# Patient Record
Sex: FEMALE | Race: WHITE
[De-identification: names, ages, dates, MRNs, and addresses within clinical notes are randomized per-mention and may not be internally consistent; named-entity substitution may affect disease eponyms.]

---

## 2020-09-06 ENCOUNTER — HOSPITAL ENCOUNTER (EMERGENCY)
Dept: HOSPITAL 47 - EC | Age: 50
Discharge: HOME | End: 2020-09-06
Payer: COMMERCIAL

## 2020-09-06 VITALS
SYSTOLIC BLOOD PRESSURE: 162 MMHG | DIASTOLIC BLOOD PRESSURE: 98 MMHG | RESPIRATION RATE: 16 BRPM | TEMPERATURE: 98.2 F | HEART RATE: 65 BPM
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
## 2020-09-06 DIAGNOSIS — Z88.0: ICD-10-CM

## 2020-09-06 DIAGNOSIS — F17.200: ICD-10-CM

## 2020-09-06 DIAGNOSIS — T14.8XXA: ICD-10-CM

## 2020-09-06 DIAGNOSIS — S91.011A: ICD-10-CM

## 2020-09-06 DIAGNOSIS — S70.11XA: Primary | ICD-10-CM

## 2020-09-06 DIAGNOSIS — J98.11: ICD-10-CM

## 2020-09-06 DIAGNOSIS — Z23: ICD-10-CM

## 2020-09-06 LAB
ALBUMIN SERPL-MCNC: 4.6 G/DL (ref 3.5–5)
ALP SERPL-CCNC: 62 U/L (ref 38–126)
ALT SERPL-CCNC: 16 U/L (ref 4–34)
ANION GAP SERPL CALC-SCNC: 8 MMOL/L
AST SERPL-CCNC: 35 U/L (ref 14–36)
BASOPHILS # BLD AUTO: 0.1 K/UL (ref 0–0.2)
BASOPHILS NFR BLD AUTO: 1 %
BUN SERPL-SCNC: 17 MG/DL (ref 7–17)
CALCIUM SPEC-MCNC: 9.6 MG/DL (ref 8.4–10.2)
CHLORIDE SERPL-SCNC: 104 MMOL/L (ref 98–107)
CO2 SERPL-SCNC: 25 MMOL/L (ref 22–30)
EOSINOPHIL # BLD AUTO: 0.5 K/UL (ref 0–0.7)
EOSINOPHIL NFR BLD AUTO: 5 %
ERYTHROCYTE [DISTWIDTH] IN BLOOD BY AUTOMATED COUNT: 4.87 M/UL (ref 3.8–5.4)
ERYTHROCYTE [DISTWIDTH] IN BLOOD: 12.5 % (ref 11.5–15.5)
GLUCOSE SERPL-MCNC: 89 MG/DL (ref 74–99)
HCT VFR BLD AUTO: 44.7 % (ref 34–46)
HGB BLD-MCNC: 14.7 GM/DL (ref 11.4–16)
KETONES UR QL STRIP.AUTO: (no result)
LYMPHOCYTES # SPEC AUTO: 2.5 K/UL (ref 1–4.8)
LYMPHOCYTES NFR SPEC AUTO: 28 %
MCH RBC QN AUTO: 30.2 PG (ref 25–35)
MCHC RBC AUTO-ENTMCNC: 32.9 G/DL (ref 31–37)
MCV RBC AUTO: 91.8 FL (ref 80–100)
MONOCYTES # BLD AUTO: 0.4 K/UL (ref 0–1)
MONOCYTES NFR BLD AUTO: 5 %
NEUTROPHILS # BLD AUTO: 5.3 K/UL (ref 1.3–7.7)
NEUTROPHILS NFR BLD AUTO: 60 %
PH UR: 6 [PH] (ref 5–8)
PLATELET # BLD AUTO: 254 K/UL (ref 150–450)
POTASSIUM SERPL-SCNC: 4.3 MMOL/L (ref 3.5–5.1)
PROT SERPL-MCNC: 7.3 G/DL (ref 6.3–8.2)
SODIUM SERPL-SCNC: 137 MMOL/L (ref 137–145)
SP GR UR: 1.05 (ref 1–1.03)
UROBILINOGEN UR QL STRIP: <2 MG/DL (ref ?–2)
WBC # BLD AUTO: 8.8 K/UL (ref 3.8–10.6)

## 2020-09-06 PROCEDURE — 72132 CT LUMBAR SPINE W/DYE: CPT

## 2020-09-06 PROCEDURE — 36415 COLL VENOUS BLD VENIPUNCTURE: CPT

## 2020-09-06 PROCEDURE — 73610 X-RAY EXAM OF ANKLE: CPT

## 2020-09-06 PROCEDURE — 85025 COMPLETE CBC W/AUTO DIFF WBC: CPT

## 2020-09-06 PROCEDURE — 90471 IMMUNIZATION ADMIN: CPT

## 2020-09-06 PROCEDURE — 81003 URINALYSIS AUTO W/O SCOPE: CPT

## 2020-09-06 PROCEDURE — 71046 X-RAY EXAM CHEST 2 VIEWS: CPT

## 2020-09-06 PROCEDURE — 73552 X-RAY EXAM OF FEMUR 2/>: CPT

## 2020-09-06 PROCEDURE — 99284 EMERGENCY DEPT VISIT MOD MDM: CPT

## 2020-09-06 PROCEDURE — 90715 TDAP VACCINE 7 YRS/> IM: CPT

## 2020-09-06 PROCEDURE — 72129 CT CHEST SPINE W/DYE: CPT

## 2020-09-06 PROCEDURE — 80053 COMPREHEN METABOLIC PANEL: CPT

## 2020-09-06 PROCEDURE — 12002 RPR S/N/AX/GEN/TRNK2.6-7.5CM: CPT

## 2020-09-06 PROCEDURE — 74177 CT ABD & PELVIS W/CONTRAST: CPT

## 2020-09-06 PROCEDURE — 83690 ASSAY OF LIPASE: CPT

## 2020-09-06 PROCEDURE — 81025 URINE PREGNANCY TEST: CPT

## 2020-09-06 NOTE — CT
EXAMINATION TYPE: CT abdomen pelvis w con

 

DATE OF EXAM: 9/6/2020

 

COMPARISON: None

 

HISTORY: back pain following crushing injury

 

CT DLP: combined  mGycm

Automated exposure control for dose reduction was used.

 

CONTRAST: 

Performed with IV Contrast, patient injected with 100 mL of Isovue 300.

 

There is minimal subsegmental atelectasis at the lung bases. Heart size is normal. There is no perica
rdial effusion. There is no pleural effusion or pneumothorax.

 

Liver spleen stomach appear normal. Bile ducts are not dilated. There is no pancreatic mass.

 

There is no adrenal mass. Kidneys show satisfactory contrast opacification. There is no hydronephrosi
s. Ureters are not dilated. Delayed images show normal renal excretion. There is no retroperitoneal a
denopathy. Bladder distends smoothly. There is no inguinal hernia. There is no free fluid in the pelv
is. Uterus is retroverted. There is no evidence of pelvic mass.

 

Lumbar vertebra have normal spacing and alignment. Posterior elements are intact. Bony pelvis is inta
ct. Hip joints appear normal.

 

There is no mesenteric edema. There is no ascites or free air. There is no bowel obstruction. Appendi
x is posterior and appears normal.

 

IMPRESSION:

No evidence of traumatic injury of the abdomen and pelvis. Mild subsegmental atelectasis at the lung 
bases.

## 2020-09-06 NOTE — XR
EXAMINATION TYPE: XR chest 2V

 

DATE OF EXAM: 9/6/2020

 

COMPARISON: NONE

 

HISTORY: Pain

 

TECHNIQUE: 2 views

 

FINDINGS: Heart and mediastinum are normal. Lungs are clear. Diaphragm is normal. Bony thorax appears
 normal.

 

IMPRESSION: Normal chest. Normal heart.

## 2020-09-06 NOTE — XR
EXAMINATION TYPE: XR femur RT

 

DATE OF EXAM: 9/6/2020

 

COMPARISON: NONE

 

HISTORY: Pain

 

TECHNIQUE: 4 views

 

FINDINGS: I see no fracture nor dislocation. Joint spaces are normal. There are no pathologic calcifi
cations.

 

IMPRESSION: Negative right femur exam.

## 2020-09-06 NOTE — ED
Trauma HPI





- General


Chief Complaint: Trauma


Stated Complaint: riding  tipped over on her


Time Seen by Provider: 09/06/20 16:45


Source: patient


Mode of arrival: ambulatory


Limitations: no limitations





- History of Present Illness


Initial Comments: 





Patient is a 50-year-old female who presents to the emergency department with 

reported thoracic and lumbar back pain, lower pelvic pain, right ankle and femur

pain secondary to a crush injury.  She reports that she was attempting to help 

her  remove a riding lawnmower from a shed.   was pulling a 

lawnmower with his truck.  The lawnmower ended up tipping over, falling on top 

of the patient and she became pinned.  Patient sustained a laceration to her 

right ankle.  He is complaining of right ankle pain, right femur pain where she 

has a hematoma and thoracic and lumbar back pain.  She does not take any blood 

thinners.  Denies hitting her head.  No loss of consciousness.  Denies any 

headaches or visual changes.  No unilateral numbness or weakness.  Patient was 

infiltrated into the emergency department without difficulty.  No concern for 

pregnancy.  Did not take anything for pain.  No other alleviating, precipitating

or modifying factors





- Related Data


                                  Previous Rx's











 Medication  Instructions  Recorded


 


Hydrocodone/Acetaminophen [Norco 1 tab PO Q6HR PRN #12 tab 09/06/20





5-325]  











                                    Allergies











Allergy/AdvReac Type Severity Reaction Status Date / Time


 


Penicillins Allergy  Rash/Hives Verified 09/06/20 16:53














Review of Systems


ROS Statement: 


Those systems with pertinent positive or pertinent negative responses have been 

documented in the HPI.





ROS Other: All systems not noted in ROS Statement are negative.





Past Medical History


Past Medical History: No Reported History


History of Any Multi-Drug Resistant Organisms: None Reported


Past Surgical History: No Surgical Hx Reported


Past Psychological History: No Psychological Hx Reported


Smoking Status: Current every day smoker


Past Alcohol Use History: Occasional


Past Drug Use History: None Reported





General Exam


Limitations: no limitations


General appearance: alert, in no apparent distress


Head exam: Present: atraumatic, normocephalic, normal inspection


Eye exam: Present: normal appearance, PERRL, EOMI.  Absent: scleral icterus, 

conjunctival injection, periorbital swelling


ENT exam: Present: normal exam, mucous membranes moist


Neck exam: Present: normal inspection.  Absent: tenderness, meningismus, 

lymphadenopathy


Respiratory exam: Present: normal lung sounds bilaterally.  Absent: respiratory 

distress, wheezes, rales, rhonchi, stridor


Cardiovascular Exam: Present: regular rate, normal rhythm, normal heart sounds. 

Absent: systolic murmur, diastolic murmur, rubs, gallop, clicks


GI/Abdominal exam: Present: soft, normal bowel sounds.  Absent: distended, 

tenderness, guarding, rebound, rigid


Extremities exam: Present: full ROM, tenderness (right lateral ankle. Mild ankle

swelling. No foot or skin pain. 2+ DP and PT pulses.), normal capillary refill. 

Absent: pedal edema, joint swelling, calf tenderness


Back exam: Present: paraspinal tenderness (entire thoracic and lumbar regions pe

r the patient. No step off or deformities appreciated. No overlying ecchmyosis)


Neurological exam: Present: alert, oriented X3, CN II-XII intact


Psychiatric exam: Present: normal affect, normal mood


Skin exam: Present: warm, dry, normal color, other (laceration right lateral 

ankle - 4 x 1 cm. ).  Absent: rash





Course


                                   Vital Signs











  09/06/20 09/06/20





  16:47 20:37


 


Temperature 98.5 F 98.2 F


 


Pulse Rate 66 65


 


Respiratory 18 16





Rate  


 


Blood Pressure 160/93 162/98


 


O2 Sat by Pulse 99 98





Oximetry  














Procedures





- Laceration


  ** Laceration #1


Consent Obtained: verbal consent


Indication: laceration


Site: lower extremity


Size (cm): 4


Description: linear


Depth: simple, single layer


Anesthetic Used: lidocaine 1%


Anesthesia Technique: local infiltration


Amount (mls): 10


Pre-repair: wound explored, irrigated extensively, deep structures intact


Type of Sutures: nylon


Size of Sutures: 5-0


Number of Sutures: 5


Technique: simple, interrupted


Patient Tolerated Procedure: well, no complications





Medical Decision Making





- Medical Decision Making





Pulmonary the patient was placed into room 28.  A thorough history and physical 

exam was performed.  Peripheral IV is established.  Patient was given a Norco 

7.5.  Tetanus is updated as patient is not up-to-date on her tetanus.  

Laboratory studies were conducted due to the significance of the trauma.  

Laboratory studies are unremarkable.  Patient was sent for a CT of her abdomen, 

pelvis, thoracic and lumbar spine.  No acute signs of trauma to the abdomen.  

Mild subsegmental atelectasis at the lung bases.  Femur and ankle x-ray are 

negative for any acute fractures.  I did repair the patient's laceration with 5,

simple interrupted sutures.  Patient will be discharged home at this time and is

to follow-up with primary care physician within 2-4 days.  She will be provided 

with a stirrup splint to wear over her right ankle and to ambulate with crutches

that she has at home.  She may need repeat imaging if she continues to have 

pain.  Return to the emergency room for any new or worsening symptoms.  She was 

given a prescription for Norco.  Side effect profile is discussed the patient.  

She must return to the emergency department in 7-10 days after sutures removed. 

Patient understood this.  Patient was discharged home in stable condition





- Lab Data


Result diagrams: 


                                 09/06/20 18:06





                                 09/06/20 18:06


                                   Lab Results











  09/06/20 09/06/20 09/06/20 Range/Units





  18:06 18:06 18:06 


 


WBC  8.8    (3.8-10.6)  k/uL


 


RBC  4.87    (3.80-5.40)  m/uL


 


Hgb  14.7    (11.4-16.0)  gm/dL


 


Hct  44.7    (34.0-46.0)  %


 


MCV  91.8    (80.0-100.0)  fL


 


MCH  30.2    (25.0-35.0)  pg


 


MCHC  32.9    (31.0-37.0)  g/dL


 


RDW  12.5    (11.5-15.5)  %


 


Plt Count  254    (150-450)  k/uL


 


Neutrophils %  60    %


 


Lymphocytes %  28    %


 


Monocytes %  5    %


 


Eosinophils %  5    %


 


Basophils %  1    %


 


Neutrophils #  5.3    (1.3-7.7)  k/uL


 


Lymphocytes #  2.5    (1.0-4.8)  k/uL


 


Monocytes #  0.4    (0-1.0)  k/uL


 


Eosinophils #  0.5    (0-0.7)  k/uL


 


Basophils #  0.1    (0-0.2)  k/uL


 


Sodium     (137-145)  mmol/L


 


Potassium     (3.5-5.1)  mmol/L


 


Chloride     ()  mmol/L


 


Carbon Dioxide     (22-30)  mmol/L


 


Anion Gap     mmol/L


 


BUN     (7-17)  mg/dL


 


Creatinine     (0.52-1.04)  mg/dL


 


Est GFR (CKD-EPI)AfAm     (>60 ml/min/1.73 sqM)  


 


Est GFR (CKD-EPI)NonAf     (>60 ml/min/1.73 sqM)  


 


Glucose     (74-99)  mg/dL


 


Calcium     (8.4-10.2)  mg/dL


 


Total Bilirubin     (0.2-1.3)  mg/dL


 


AST     (14-36)  U/L


 


ALT     (4-34)  U/L


 


Alkaline Phosphatase     ()  U/L


 


Total Protein     (6.3-8.2)  g/dL


 


Albumin     (3.5-5.0)  g/dL


 


Lipase     ()  U/L


 


Urine Color   Light Yellow   


 


Urine Appearance   Clear   (Clear)  


 


Urine pH   6.0   (5.0-8.0)  


 


Ur Specific Gravity   1.050 H   (1.001-1.035)  


 


Urine Protein   Negative   (Negative)  


 


Urine Glucose (UA)   Negative   (Negative)  


 


Urine Ketones   1+ H   (Negative)  


 


Urine Blood   Negative   (Negative)  


 


Urine Nitrite   Negative   (Negative)  


 


Urine Bilirubin   Negative   (Negative)  


 


Urine Urobilinogen   <2.0   (<2.0)  mg/dL


 


Ur Leukocyte Esterase   Negative   (Negative)  


 


Urine HCG, Qual    Not Detected  (Not Detectd)  














  09/06/20 Range/Units





  18:06 


 


WBC   (3.8-10.6)  k/uL


 


RBC   (3.80-5.40)  m/uL


 


Hgb   (11.4-16.0)  gm/dL


 


Hct   (34.0-46.0)  %


 


MCV   (80.0-100.0)  fL


 


MCH   (25.0-35.0)  pg


 


MCHC   (31.0-37.0)  g/dL


 


RDW   (11.5-15.5)  %


 


Plt Count   (150-450)  k/uL


 


Neutrophils %   %


 


Lymphocytes %   %


 


Monocytes %   %


 


Eosinophils %   %


 


Basophils %   %


 


Neutrophils #   (1.3-7.7)  k/uL


 


Lymphocytes #   (1.0-4.8)  k/uL


 


Monocytes #   (0-1.0)  k/uL


 


Eosinophils #   (0-0.7)  k/uL


 


Basophils #   (0-0.2)  k/uL


 


Sodium  137  (137-145)  mmol/L


 


Potassium  4.3  (3.5-5.1)  mmol/L


 


Chloride  104  ()  mmol/L


 


Carbon Dioxide  25  (22-30)  mmol/L


 


Anion Gap  8  mmol/L


 


BUN  17  (7-17)  mg/dL


 


Creatinine  0.55  (0.52-1.04)  mg/dL


 


Est GFR (CKD-EPI)AfAm  >90  (>60 ml/min/1.73 sqM)  


 


Est GFR (CKD-EPI)NonAf  >90  (>60 ml/min/1.73 sqM)  


 


Glucose  89  (74-99)  mg/dL


 


Calcium  9.6  (8.4-10.2)  mg/dL


 


Total Bilirubin  0.6  (0.2-1.3)  mg/dL


 


AST  35  (14-36)  U/L


 


ALT  16  (4-34)  U/L


 


Alkaline Phosphatase  62  ()  U/L


 


Total Protein  7.3  (6.3-8.2)  g/dL


 


Albumin  4.6  (3.5-5.0)  g/dL


 


Lipase  35  ()  U/L


 


Urine Color   


 


Urine Appearance   (Clear)  


 


Urine pH   (5.0-8.0)  


 


Ur Specific Gravity   (1.001-1.035)  


 


Urine Protein   (Negative)  


 


Urine Glucose (UA)   (Negative)  


 


Urine Ketones   (Negative)  


 


Urine Blood   (Negative)  


 


Urine Nitrite   (Negative)  


 


Urine Bilirubin   (Negative)  


 


Urine Urobilinogen   (<2.0)  mg/dL


 


Ur Leukocyte Esterase   (Negative)  


 


Urine HCG, Qual   (Not Detectd)  














Disposition


Clinical Impression: 


 Laceration of right ankle, Crush injury, Acute thoracic back pain, Lumbar back 

pain





Disposition: HOME SELF-CARE


Condition: Stable


Instructions (If sedation given, give patient instructions):  Care For Your 

Stitches (DC), Laceration (ED)


Additional Instructions: 


Please follow-up with your primary care doctor.  Return to the emergency room 

for any new or worsening symptoms


Prescriptions: 


Hydrocodone/Acetaminophen [Norco 5-325] 1 tab PO Q6HR PRN #12 tab


 PRN Reason: Pain


Is patient prescribed a controlled substance at d/c from ED?: Yes


When asked, does pt state using other controlled substances?: No


If prescribed controlled substance>3 days was MAPS reviewed?: Prescribed <3 Days


If opioid is for acute pain is fill amount 7 days or less?: Yes


If Rx opioid, was Start Talking consent form obtained?: Yes


Referrals: 


None,Stated [Primary Care Provider] - 1-2 days


SHAMA Handy,  [Doctor of Osteopathic Medicine] - 1-2 days


Time of Disposition: 19:59

## 2020-09-06 NOTE — XR
EXAMINATION TYPE: XR ankle complete RT

 

DATE OF EXAM: 9/6/2020

 

COMPARISON: NONE

 

HISTORY: Crush injury. Pain

 

TECHNIQUE: 4 views

 

FINDINGS: Ankle mortise is anatomic. I see no fracture nor dislocation. There is some lateral soft ti
ssue deformity over the distal fibula on the frontal view.

 

IMPRESSION: Possible laceration. No fracture seen. No evidence of a foreign body.

## 2020-09-06 NOTE — CT
EXAMINATION TYPE: CT thor lumbar spine w con

 

DATE OF EXAM: 9/6/2020

 

COMPARISON: None

 

HISTORY: back pain following crushing injury

 

CT DLP: combined  mGycm

Automated exposure control for dose reduction was used.

 

CONTRAST: 

Performed with IV Contrast, patient injected with 100 mL of Isovue 300.

 

Images were obtained from the level of T1-S2 vertebra with IV contrast present.

 

Thoracic and lumbar vertebra have normal spacing and alignment. There is no compression fracture. The
re is no paraspinal mass. I see no bony destructive process. The posterior elements are intact. Sacro
iliac joints appear normal. Facet joints appear normal. There is no evidence of a pneumothorax.

 

IMPRESSION:

Negative CT scan of the thoracic and lumbar spine. No fracture seen..

## 2020-09-07 NOTE — CDI
Dear Rosenda Spivey DO

Please do addendum for length of laceration repaired, required

Thank you,

Ileana Villeda,



If you have any questions, please contact  at [phone]

[facility]